# Patient Record
Sex: FEMALE | Employment: STUDENT | URBAN - METROPOLITAN AREA
[De-identification: names, ages, dates, MRNs, and addresses within clinical notes are randomized per-mention and may not be internally consistent; named-entity substitution may affect disease eponyms.]

---

## 2021-10-11 ENCOUNTER — TELEPHONE (OUTPATIENT)
Dept: OBGYN CLINIC | Facility: CLINIC | Age: 46
End: 2021-10-11

## 2021-11-18 ENCOUNTER — OFFICE VISIT (OUTPATIENT)
Dept: GASTROENTEROLOGY | Facility: CLINIC | Age: 46
End: 2021-11-18
Payer: COMMERCIAL

## 2021-11-18 VITALS
BODY MASS INDEX: 29.48 KG/M2 | DIASTOLIC BLOOD PRESSURE: 84 MMHG | WEIGHT: 146.2 LBS | HEART RATE: 61 BPM | SYSTOLIC BLOOD PRESSURE: 130 MMHG | HEIGHT: 59 IN

## 2021-11-18 DIAGNOSIS — K59.00 CONSTIPATION, UNSPECIFIED CONSTIPATION TYPE: ICD-10-CM

## 2021-11-18 DIAGNOSIS — R14.0 BLOATING: ICD-10-CM

## 2021-11-18 DIAGNOSIS — R10.84 GENERALIZED ABDOMINAL PAIN: Primary | ICD-10-CM

## 2021-11-18 PROCEDURE — 99203 OFFICE O/P NEW LOW 30 MIN: CPT | Performed by: NURSE PRACTITIONER

## 2021-11-18 RX ORDER — MULTIVITAMIN
1 TABLET ORAL DAILY
COMMUNITY

## 2022-03-07 ENCOUNTER — OFFICE VISIT (OUTPATIENT)
Dept: GASTROENTEROLOGY | Facility: CLINIC | Age: 47
End: 2022-03-07
Payer: COMMERCIAL

## 2022-03-07 VITALS
SYSTOLIC BLOOD PRESSURE: 142 MMHG | HEIGHT: 59 IN | WEIGHT: 144.6 LBS | DIASTOLIC BLOOD PRESSURE: 84 MMHG | BODY MASS INDEX: 29.15 KG/M2

## 2022-03-07 DIAGNOSIS — K58.2 IRRITABLE BOWEL SYNDROME WITH BOTH CONSTIPATION AND DIARRHEA: ICD-10-CM

## 2022-03-07 DIAGNOSIS — K21.9 GASTROESOPHAGEAL REFLUX DISEASE, UNSPECIFIED WHETHER ESOPHAGITIS PRESENT: Primary | ICD-10-CM

## 2022-03-07 DIAGNOSIS — Z12.11 SCREENING FOR COLON CANCER: Primary | ICD-10-CM

## 2022-03-07 DIAGNOSIS — K59.00 CONSTIPATION, UNSPECIFIED CONSTIPATION TYPE: ICD-10-CM

## 2022-03-07 DIAGNOSIS — R10.32 LEFT LOWER QUADRANT ABDOMINAL PAIN: ICD-10-CM

## 2022-03-07 PROCEDURE — 99213 OFFICE O/P EST LOW 20 MIN: CPT | Performed by: NURSE PRACTITIONER

## 2022-03-07 NOTE — PROGRESS NOTES
[address] [facility] - Outpatient Follow-up Note  Nacho Stewart 55 y o  female MRN: [id number]  Encounter: [id number]    ASSESSMENT AND PLAN:      1  Gastroesophageal reflux disease, unspecified whether esophagitis present  Patient states she has been experiencing increased acid reflux symptoms  She states she can feel at times acid reflux bubbling up in her esophagus  Patient states that her symptoms generally increased shortly after eating  Currently not taking any acid reflux medications  Discussed acid reflux diet, no med changes at this time until after EGD  Consider GERD, functional dyspepsia, gastric ulcer     - schedule EGD at [facility])  - frequent small meal  - no eating 2-3 hours before bedtime  - decrease caffeine  - acid reflux diet  - elevate head of bed  - food diary    2  Left lower quadrant abdominal pain  3  Constipation, unspecified constipation type  4  Irritable bowel syndrome with both constipation and diarrhea  Patient states after her last office visit in November 2021 she had been using the MiraLax and increase her fiber and was moving her bowels a little more frequently  With increased stress and personal issues she had not gotten enough of either at infrequently became constipated and then using the MiraLax had gotten diarrhea  She states after having bowel movements she would have increased lower left quadrant pain  Consider IBS-C, functional constipation    - schedule colonoscopy at [facility])  - Colace 100 mg p o  HS  - increase fluids  - food diary  - 20 g fiber daily  - MiraLax adjust for a bowel movement every day or every other day  Followup Appointment:  2 months, after EGD/Colonoscopy  ______________________________________________________________________    Chief Complaint   Patient presents with    Follow-up abdominal pain     HPI:  51-year-old female presents for follow-up abdominal pain, bloating, constipation    Patient states that since last office visit she had tried to increase her fiber and she was taking the MiraLax however stress and personal issues she got away from her routine and the constipation return  She states she can go from diarrhea to constipation sometimes I believe it is due to her medication changes  And she states once she empties out from a bowel movement she has left lower quadrant pain as well  She denies nausea or vomiting  States she currently is having acid reflux increased after eating  She states she feels the reflux bubbling up in her esophagus  Abdominal pain changes with constipation versus diarrhea  Can be daily or every other day  Her bowel movements are routinely constipation versus loose  She denies hematochezia or melena  Patient states she has never had a colonoscopy, no family history of colon cancer or colon polyps, and has never had an EGD either  Nonsmoker, occasional EtOH        Historical Information   Past Medical History:   Diagnosis Date    GERD (gastroesophageal reflux disease)      Past Surgical History:   Procedure Laterality Date    HYSTERECTOMY      MYOMECTOMY       Social History     Substance and Sexual Activity   Alcohol Use Yes    Comment: rarly     Social History     Substance and Sexual Activity   Drug Use Not on file     Social History     Tobacco Use   Smoking Status Never Smoker   Smokeless Tobacco Never Used     Family History   Problem Relation Age of Onset    Breast cancer Mother     Hypertension Mother     Thyroid disease Mother     Depression Father     Hypertension Father     Lupus Sister     No Known Problems Brother     Thyroid disease Sister     Colon cancer Neg Hx     Colon polyps Neg Hx          Current Outpatient Medications:     Multiple Vitamin (multivitamin) tablet  Allergies   Allergen Reactions    Bactrim [Sulfamethoxazole-Trimethoprim] Rash    Cleocin [Clindamycin] Rash     Reviewed medications and allergies and updated as indicated    PHYSICAL EXAM: Blood pressure 142/84, height 4' 11 02" (1 499 m), weight 65 6 kg (144 lb 9 6 oz)  Body mass index is 29 19 kg/m²  General Appearance: NAD, cooperative, alert  Eyes: Anicteric, PERRLA, EOMI  ENT:  Normocephalic, atraumatic, normal mucosa  Neck:  Supple, symmetrical, trachea midline  Resp:  Clear to auscultation bilaterally; no rales, rhonchi or wheezing; respirations unlabored   CV:  S1 S2, Regular rate and rhythm; no murmur, rub, or gallop  GI:  Soft, increased right lower quadrant and left lower quadrant tenderness on palpation, non-distended; normal bowel sounds; no masses, no organomegaly   Rectal: Deferred  Musculoskeletal: No cyanosis, clubbing or edema  Normal ROM  Skin:  No jaundice, rashes, or lesions   Heme/Lymph: No palpable cervical lymphadenopathy  Psych: Normal affect, good eye contact  Neuro: No gross deficits, AAOx3    Lab Results:   No results found for: WBC, HGB, HCT, MCV, PLT  No results found for: NA, K, CL, CO2, ANIONGAP, BUN, CREATININE, GLUCOSE, GLUF, CALCIUM, CORRECTEDCA, AST, ALT, ALKPHOS, PROT, BILITOT, EGFR  No results found for: IRON, TIBC, FERRITIN  No results found for: LIPASE    Radiology Results:   No results found

## 2022-03-07 NOTE — PATIENT INSTRUCTIONS
Increase fluids  Colace 100mg bedtime  Food diary  Fiber 20 mg / day  Miralax; adjust for a normal bowel every day or every other day

## 2022-03-07 NOTE — TELEPHONE ENCOUNTER
Scheduled date of colonoscopy (as of today): 4/22/22  Physician performing colonoscopy: Dr Elida Miller  Location of colonoscopy:[facility]  Bowel prep reviewed with patient: Clenpiq  Instructions reviewed with patient by:  Roderick Lopez CMA  Clearances: None

## 2022-03-08 RX ORDER — SODIUM PICOSULFATE, MAGNESIUM OXIDE, AND ANHYDROUS CITRIC ACID 10; 3.5; 12 MG/160ML; G/160ML; G/160ML
LIQUID ORAL
Qty: 320 ML | Refills: 0 | Status: SHIPPED | COMMUNITY
Start: 2022-03-08 | End: 2022-04-15 | Stop reason: HOSPADM

## 2022-04-15 ENCOUNTER — ANESTHESIA (OUTPATIENT)
Dept: GASTROENTEROLOGY | Facility: AMBULATORY SURGERY CENTER | Age: 47
End: 2022-04-15

## 2022-04-15 ENCOUNTER — HOSPITAL ENCOUNTER (OUTPATIENT)
Dept: GASTROENTEROLOGY | Facility: AMBULATORY SURGERY CENTER | Age: 47
Discharge: HOME | End: 2022-04-15
Attending: INTERNAL MEDICINE
Payer: COMMERCIAL

## 2022-04-15 ENCOUNTER — ANESTHESIA EVENT (OUTPATIENT)
Dept: GASTROENTEROLOGY | Facility: AMBULATORY SURGERY CENTER | Age: 47
End: 2022-04-15

## 2022-04-15 VITALS
SYSTOLIC BLOOD PRESSURE: 139 MMHG | OXYGEN SATURATION: 99 % | TEMPERATURE: 97.6 F | DIASTOLIC BLOOD PRESSURE: 70 MMHG | HEART RATE: 82 BPM | RESPIRATION RATE: 20 BRPM

## 2022-04-15 DIAGNOSIS — K59.00 CONSTIPATION, UNSPECIFIED CONSTIPATION TYPE: ICD-10-CM

## 2022-04-15 DIAGNOSIS — R10.32 LEFT LOWER QUADRANT ABDOMINAL PAIN: ICD-10-CM

## 2022-04-15 DIAGNOSIS — K58.2 IRRITABLE BOWEL SYNDROME WITH BOTH CONSTIPATION AND DIARRHEA: ICD-10-CM

## 2022-04-15 DIAGNOSIS — K21.9 GASTROESOPHAGEAL REFLUX DISEASE, UNSPECIFIED WHETHER ESOPHAGITIS PRESENT: ICD-10-CM

## 2022-04-15 PROCEDURE — 43239 EGD BIOPSY SINGLE/MULTIPLE: CPT | Performed by: INTERNAL MEDICINE

## 2022-04-15 PROCEDURE — 45378 DIAGNOSTIC COLONOSCOPY: CPT | Performed by: INTERNAL MEDICINE

## 2022-04-15 RX ORDER — LIDOCAINE HYDROCHLORIDE 10 MG/ML
INJECTION, SOLUTION EPIDURAL; INFILTRATION; INTRACAUDAL; PERINEURAL AS NEEDED
Status: DISCONTINUED | OUTPATIENT
Start: 2022-04-15 | End: 2022-04-15

## 2022-04-15 RX ORDER — PROPOFOL 10 MG/ML
INJECTION, EMULSION INTRAVENOUS AS NEEDED
Status: DISCONTINUED | OUTPATIENT
Start: 2022-04-15 | End: 2022-04-15

## 2022-04-15 RX ORDER — SODIUM CHLORIDE, SODIUM LACTATE, POTASSIUM CHLORIDE, CALCIUM CHLORIDE 600; 310; 30; 20 MG/100ML; MG/100ML; MG/100ML; MG/100ML
50 INJECTION, SOLUTION INTRAVENOUS CONTINUOUS
Status: DISCONTINUED | OUTPATIENT
Start: 2022-04-15 | End: 2022-04-19 | Stop reason: HOSPADM

## 2022-04-15 RX ADMIN — PROPOFOL 50 MG: 10 INJECTION, EMULSION INTRAVENOUS at 13:31

## 2022-04-15 RX ADMIN — PROPOFOL 100 MG: 10 INJECTION, EMULSION INTRAVENOUS at 13:10

## 2022-04-15 RX ADMIN — PROPOFOL 50 MG: 10 INJECTION, EMULSION INTRAVENOUS at 13:26

## 2022-04-15 RX ADMIN — LIDOCAINE HYDROCHLORIDE 50 MG: 10 INJECTION, SOLUTION EPIDURAL; INFILTRATION; INTRACAUDAL; PERINEURAL at 13:05

## 2022-04-15 RX ADMIN — PROPOFOL 50 MG: 10 INJECTION, EMULSION INTRAVENOUS at 13:18

## 2022-04-15 RX ADMIN — SODIUM CHLORIDE, SODIUM LACTATE, POTASSIUM CHLORIDE, CALCIUM CHLORIDE 50 ML/HR: 600; 310; 30; 20 INJECTION, SOLUTION INTRAVENOUS at 13:02

## 2022-04-15 NOTE — ANESTHESIA PREPROCEDURE EVALUATION
Procedure:  COLONOSCOPY  EGD    Relevant Problems   No relevant active problems        Physical Exam    Airway    Mallampati score: II  TM Distance: >3 FB  Neck ROM: full     Dental   No notable dental hx     Cardiovascular  Cardiovascular exam normal    Pulmonary  Pulmonary exam normal     Other Findings        Anesthesia Plan  ASA Score- 1     Anesthesia Type- IV sedation with anesthesia with ASA Monitors  Additional Monitors:   Airway Plan:           Plan Factors-    Chart reviewed  Patient summary reviewed  Patient is not a current smoker  Induction- intravenous  Postoperative Plan-     Informed Consent- Anesthetic plan and risks discussed with patient  I personally reviewed this patient with the CRNA  Discussed and agreed on the Anesthesia Plan with the CRNA  Sweta Jason

## 2022-04-15 NOTE — H&P
History and Physical -  [facility]  [facility] 55 y o  female MRN: [id number]    HPI: [facility] is a 55y o  year old female who presents for EGD and colonoscopy secondary to GERD and constipation and abdominal pain    REVIEW OF SYSTEMS: Per the HPI, and otherwise unremarkable      Historical Information   Past Medical History:   Diagnosis Date    GERD (gastroesophageal reflux disease)      Past Surgical History:   Procedure Laterality Date    ANKLE SURGERY Left     HYSTERECTOMY      MYOMECTOMY      SINUS SURGERY      TONSILLECTOMY      WRIST SURGERY Right      Social History   Social History     Substance and Sexual Activity   Alcohol Use Not Currently    Comment: rarly     Social History     Substance and Sexual Activity   Drug Use Never     Social History     Tobacco Use   Smoking Status Never Smoker   Smokeless Tobacco Never Used     Family History   Problem Relation Age of Onset    Breast cancer Mother     Hypertension Mother     Thyroid disease Mother     Depression Father     Hypertension Father     Lupus Sister     No Known Problems Brother     Thyroid disease Sister     Colon cancer Neg Hx     Colon polyps Neg Hx        Meds/Allergies       Current Outpatient Medications:     Multiple Vitamin (multivitamin) tablet    Sod Picosulfate-Mag Ox-Cit Acd (Clenpiq) 10-3 5-12 MG-GM -GM/160ML SOLN    Current Facility-Administered Medications:     lactated ringers infusion, 50 mL/hr, Intravenous, Continuous    Allergies   Allergen Reactions    Bactrim [Sulfamethoxazole-Trimethoprim] Rash    Cleocin [Clindamycin] Rash       Objective     /65   Pulse 80   Temp 97 6 °F (36 4 °C) (Temporal)   Resp 20   SpO2 99%     PHYSICAL EXAM    Gen: NAD AAOx3  Head: Normocephalic, Atraumatic  CV: S1S2 RRR no m/r/g  CHEST: Clear b/l no c/r/w  ABD: soft, +BS NT/ND  EXT: no edema    ASSESSMENT/PLAN:  This is a 55y o  year old female here for EGD and colonoscopy secondary to constipation, abdominal pain, and GERD, and she is stable and optimized for her procedure

## 2022-04-15 NOTE — ANESTHESIA POSTPROCEDURE EVALUATION
Post-Op Assessment Note    CV Status:  Stable  Pain Score: 0    Pain management: adequate     Hydration Status:  Stable   PONV Controlled:  None   Airway Patency:  Patent      Post Op Vitals Reviewed: Yes      Staff: CRNA         No complications documented      BP   126/72   Temp      Pulse 67   Resp 14   SpO2 100%

## 2022-04-15 NOTE — DISCHARGE INSTRUCTIONS
Upper Endoscopy and Colonoscopy   WHAT YOU NEED TO KNOW:   An upper endoscopy is also called an upper gastrointestinal (GI) endoscopy, or an esophagogastroduodenoscopy (EGD)  It is a procedure to examine the inside of your esophagus, stomach, and duodenum (first part of the small intestine) with a scope  You may feel bloated, gassy, or have some abdominal discomfort after your procedure  Your throat may be sore for 24 to 36 hours  You may burp or pass gas from air that is still inside your body  A colonoscopy is a procedure to examine the inside of your colon (intestine) with a scope  Polyps or tissue growths may have been removed during your colonoscopy  It is normal to feel bloated and to have some abdominal discomfort  You should be passing gas  If you have hemorrhoids or you had polyps removed, you may have a small amount of bleeding  DISCHARGE INSTRUCTIONS:   Seek care immediately if:   · You have sudden, severe abdominal pain  · You have problems swallowing  · You have a large amount of black, sticky bowel movements or blood in your bowel movements  · You have sudden trouble breathing  · You feel weak, lightheaded, or faint or your heart beats faster than normal for you  Contact your healthcare provider if:   · You have a fever and chills  · You have nausea or are vomiting  · Your abdomen is bloated or feels full and hard  · You have abdominal pain  · You have a large amount of black, sticky bowel movements or blood in your bowel movements  · You have not had a bowel movement for 3 days after your procedure  · You have rash or hives  · You have questions or concerns about your procedure  Activity:   ·       Do not lift, strain, or run for 24 hours after your procedure  ·       Rest after your procedure  You have been given medicine to relax you  Do not drive or make important decisions until the day after your procedure   Return to your normal activity as directed  ·       Relieve gas and discomfort from bloating by lying on your right side with a heating pad on your abdomen  You may need to take short walks to help the gas move out  Eat small meals until bloating is relieved  Follow up with your healthcare provider as directed: Write down your questions so you remember to ask them during your visits  If you take a blood thinner, please review the specific instructions from your endoscopist about when you should resume it  These can be found in the Recommendation and Your Medication list sections of this After Visit Summary  Hemorrhoids   WHAT YOU NEED TO KNOW:   What are hemorrhoids? Hemorrhoids are swollen blood vessels inside your rectum (internal hemorrhoids) or on your anus (external hemorrhoids)  Sometimes a hemorrhoid may prolapse  This means it extends out of your anus  What increases my risk for hemorrhoids? · Pregnancy or obesity    · Straining or sitting for a long time during bowel movements    · Liver disease    · Weak muscles around the anus caused by older age, rectal surgery, or anal intercourse    · A lack of physical activity    · Chronic diarrhea or constipation    · A low-fiber diet    What are the signs and symptoms of hemorrhoids? · Pain or itching around your anus or inside your rectum    · Swelling or bumps around your anus    · Bright red blood in your bowel movement, on the toilet paper, or in the toilet bowl    · Tissue bulging out of your anus (prolapsed hemorrhoids)    · Incontinence (poor control over urine or bowel movements)    How are hemorrhoids diagnosed? Your healthcare provider will ask about your symptoms, the foods you eat, and your bowel movements  He or she will examine your anus for external hemorrhoids  You may need the following:  · A digital rectal exam  is a test to check for hemorrhoids   Your healthcare provider will put a gloved finger inside your anus to feel for the hemorrhoids  · An anoscopy  is a test that uses a scope (small tube with a light and camera on the end) to look at your hemorrhoids  How are hemorrhoids treated? Treatment will depend on your symptoms  You may need any of the following:  · Medicines  can help decrease pain and swelling, and soften your bowel movement  The medicine may be a pill, pad, cream, or ointment  · Procedures  may be used to shrink or remove your hemorrhoid  Examples include rubber-band ligation, sclerotherapy, and photocoagulation  These procedures may be done in your healthcare provider's office  Ask your healthcare provider for more information about these procedures  · Surgery  may be needed to shrink or remove your hemorrhoids  How can I manage my symptoms? · Apply ice on your anus for 15 to 20 minutes every hour or as directed  Use an ice pack, or put crushed ice in a plastic bag  Cover it with a towel before you apply it to your anus  Ice helps prevent tissue damage and decreases swelling and pain  · Take a sitz bath  Fill a bathtub with 4 to 6 inches of warm water  You may also use a sitz bath pan that fits inside a toilet bowl  Sit in the sitz bath for 15 minutes  Do this 3 times a day, and after each bowel movement  The warm water can help decrease pain and swelling  · Keep your anal area clean  Gently wash the area with warm water daily  Soap may irritate the area  After a bowel movement, wipe with moist towelettes or wet toilet paper  Dry toilet paper can irritate the area  How can I help prevent hemorrhoids? · Do not strain to have a bowel movement  Do not sit on the toilet too long  These actions can increase pressure on the tissues in your rectum and anus  · Drink plenty of liquids  Liquids can help prevent constipation  Ask how much liquid to drink each day and which liquids are best for you  · Eat a variety of high-fiber foods    Examples include fruits, vegetables, and whole grains  Ask your healthcare provider how much fiber you need each day  You may need to take a fiber supplement  · Exercise as directed  Exercise, such as walking, may make it easier to have a bowel movement  Ask your healthcare provider to help you create an exercise plan  · Do not have anal sex  Anal sex can weaken the skin around your rectum and anus  · Avoid heavy lifting  This can cause straining and increase your risk for another hemorrhoid  When should I seek immediate care? · You have severe pain in your rectum or around your anus  · You have severe pain in your abdomen and you are vomiting  · You have bleeding from your anus that soaks through your underwear  When should I contact my healthcare provider? · You have frequent and painful bowel movements  · Your hemorrhoid looks or feels more swollen than usual      · You do not have a bowel movement for 2 days or more  · You see or feel tissue coming through your anus  · You have questions or concerns about your condition or care  CARE AGREEMENT:   You have the right to help plan your care  Learn about your health condition and how it may be treated  Discuss treatment options with your healthcare providers to decide what care you want to receive  You always have the right to refuse treatment  The above information is an  only  It is not intended as medical advice for individual conditions or treatments  Talk to your doctor, nurse or pharmacist before following any medical regimen to see if it is safe and effective for you  © Copyright [employer] 2022 Information is for End User's use only and may not be sold, redistributed or otherwise used for commercial purposes  All illustrations and images included in CareNotes® are the copyrighted property of A D A M , Inc  or Rashmi Perry  Hemorrhoids   WHAT YOU NEED TO KNOW:   What are hemorrhoids?   Hemorrhoids are swollen blood vessels inside your rectum (internal hemorrhoids) or on your anus (external hemorrhoids)  Sometimes a hemorrhoid may prolapse  This means it extends out of your anus  What increases my risk for hemorrhoids? · Pregnancy or obesity    · Straining or sitting for a long time during bowel movements    · Liver disease    · Weak muscles around the anus caused by older age, rectal surgery, or anal intercourse    · A lack of physical activity    · Chronic diarrhea or constipation    · A low-fiber diet    What are the signs and symptoms of hemorrhoids? · Pain or itching around your anus or inside your rectum    · Swelling or bumps around your anus    · Bright red blood in your bowel movement, on the toilet paper, or in the toilet bowl    · Tissue bulging out of your anus (prolapsed hemorrhoids)    · Incontinence (poor control over urine or bowel movements)    How are hemorrhoids diagnosed? Your healthcare provider will ask about your symptoms, the foods you eat, and your bowel movements  He or she will examine your anus for external hemorrhoids  You may need the following:  · A digital rectal exam  is a test to check for hemorrhoids  Your healthcare provider will put a gloved finger inside your anus to feel for the hemorrhoids  · An anoscopy  is a test that uses a scope (small tube with a light and camera on the end) to look at your hemorrhoids  How are hemorrhoids treated? Treatment will depend on your symptoms  You may need any of the following:  · Medicines  can help decrease pain and swelling, and soften your bowel movement  The medicine may be a pill, pad, cream, or ointment  · Procedures  may be used to shrink or remove your hemorrhoid  Examples include rubber-band ligation, sclerotherapy, and photocoagulation  These procedures may be done in your healthcare provider's office  Ask your healthcare provider for more information about these procedures       · Surgery  may be needed to shrink or remove your hemorrhoids  How can I manage my symptoms? · Apply ice on your anus for 15 to 20 minutes every hour or as directed  Use an ice pack, or put crushed ice in a plastic bag  Cover it with a towel before you apply it to your anus  Ice helps prevent tissue damage and decreases swelling and pain  · Take a sitz bath  Fill a bathtub with 4 to 6 inches of warm water  You may also use a sitz bath pan that fits inside a toilet bowl  Sit in the sitz bath for 15 minutes  Do this 3 times a day, and after each bowel movement  The warm water can help decrease pain and swelling  · Keep your anal area clean  Gently wash the area with warm water daily  Soap may irritate the area  After a bowel movement, wipe with moist towelettes or wet toilet paper  Dry toilet paper can irritate the area  How can I help prevent hemorrhoids? · Do not strain to have a bowel movement  Do not sit on the toilet too long  These actions can increase pressure on the tissues in your rectum and anus  · Drink plenty of liquids  Liquids can help prevent constipation  Ask how much liquid to drink each day and which liquids are best for you  · Eat a variety of high-fiber foods  Examples include fruits, vegetables, and whole grains  Ask your healthcare provider how much fiber you need each day  You may need to take a fiber supplement  · Exercise as directed  Exercise, such as walking, may make it easier to have a bowel movement  Ask your healthcare provider to help you create an exercise plan  · Do not have anal sex  Anal sex can weaken the skin around your rectum and anus  · Avoid heavy lifting  This can cause straining and increase your risk for another hemorrhoid  When should I seek immediate care? · You have severe pain in your rectum or around your anus  · You have severe pain in your abdomen and you are vomiting  · You have bleeding from your anus that soaks through your underwear      When should I contact my healthcare provider? · You have frequent and painful bowel movements  · Your hemorrhoid looks or feels more swollen than usual      · You do not have a bowel movement for 2 days or more  · You see or feel tissue coming through your anus  · You have questions or concerns about your condition or care  CARE AGREEMENT:   You have the right to help plan your care  Learn about your health condition and how it may be treated  Discuss treatment options with your healthcare providers to decide what care you want to receive  You always have the right to refuse treatment  The above information is an  only  It is not intended as medical advice for individual conditions or treatments  Talk to your doctor, nurse or pharmacist before following any medical regimen to see if it is safe and effective for you  © Copyright [employer] 2022 Information is for End User's use only and may not be sold, redistributed or otherwise used for commercial purposes  All illustrations and images included in CareNotes® are the copyrighted property of A D A M , Inc  or [facility] Augustine Donaldson       Gastritis   WHAT YOU NEED TO KNOW:   What is gastritis? Gastritis is inflammation or irritation of the lining of your stomach  What increases my risk for gastritis? · Infection with bacteria, a virus, or a parasite    · NSAIDs, aspirin, or steroid medicine    · Use of tobacco products or alcohol    · Trauma such as an injury to your stomach or intestine    · Autoimmune disorders such as diabetes, thyroid disease, or Crohn disease    · Stress    · Age older than 60 years    · Illegal drugs, such as cocaine    What are the signs and symptoms of gastritis?    · Stomach pain, burning, or tenderness when you press on it    · Stomach fullness or tightness    · Nausea or vomiting    · Loss of appetite, or feeling full quickly when you eat    · Bad breath    · Fatigue or feeling more tired than usual    · Heartburn    How is gastritis diagnosed? Your healthcare provider will ask about your signs and symptoms and examine you  You may need any of the following:  · Blood tests  may be used to show an infection, dehydration, or anemia (low red blood cell levels)  · A bowel movement sample  may be tested for blood or the germ that may be causing your gastritis  · A breath test  may show if H pylori is causing your gastritis  You will be given a liquid to drink  Then you will breathe into a bag  Your healthcare provider will measure the amount of carbon dioxide in your breath  Extra amounts of carbon dioxide may mean you have an H pylori infection  · An endoscopy  may be used to look for irritation or bleeding in your stomach  Your healthcare provider will use an endoscope (tube with a light and camera on the end) during the procedure  He or she may take a sample from your stomach to be tested  How is gastritis treated? Your symptoms may go away without treatment  Treatment will depend on what is causing your gastritis  Your healthcare provider may recommend changes to the medicines you take  Medicines may be given to help treat a bacterial infection or decrease stomach acid  How can I manage or prevent gastritis? · Do not smoke  Nicotine and other chemicals in cigarettes and cigars can make your symptoms worse and cause lung damage  Ask your healthcare provider for information if you currently smoke and need help to quit  E-cigarettes or smokeless tobacco still contain nicotine  Talk to your healthcare provider before you use these products  · Do not drink alcohol  Alcohol can prevent healing and make your gastritis worse  Talk to your healthcare provider if you need help to stop drinking  · Do not take NSAIDs or aspirin unless directed  These and similar medicines can cause irritation of your stomach lining   If your healthcare provider says it is okay to take NSAIDs, take them with food      · Do not eat foods that cause irritation  Foods such as oranges and salsa can cause burning or pain  Eat a variety of healthy foods  Examples include fruits (not citrus), vegetables, low-fat dairy products, beans, whole-grain breads, and lean meats and fish  Try to eat small meals, and drink water with your meals  Do not eat for at least 3 hours before you go to bed  · Find ways to relax and decrease stress  Stress can increase stomach acid and make gastritis worse  Activities such as yoga, meditation, or listening to music can help you relax  Spend time with friends, or do things you enjoy  Call 911 for any of the following:   · You develop chest pain or shortness of breath  When should I seek immediate care? · You vomit blood  · You have black or bloody bowel movements  · You have severe stomach or back pain  When should I contact my healthcare provider? · You have a fever  · You have new or worsening symptoms, even after treatment  · You have questions or concerns about your condition or care  CARE AGREEMENT:   You have the right to help plan your care  Learn about your health condition and how it may be treated  Discuss treatment options with your healthcare providers to decide what care you want to receive  You always have the right to refuse treatment  The above information is an  only  It is not intended as medical advice for individual conditions or treatments  Talk to your doctor, nurse or pharmacist before following any medical regimen to see if it is safe and effective for you  © Copyright [employer] 2022 Information is for End User's use only and may not be sold, redistributed or otherwise used for commercial purposes   All illustrations and images included in CareNotes® are the copyrighted property of A D A M , Inc  or [address] [place], [place] [address]

## 2022-04-22 ENCOUNTER — TELEPHONE (OUTPATIENT)
Dept: GASTROENTEROLOGY | Facility: CLINIC | Age: 47
End: 2022-04-22

## 2022-04-22 DIAGNOSIS — A04.8 H. PYLORI INFECTION: Primary | ICD-10-CM

## 2022-04-22 RX ORDER — PANTOPRAZOLE SODIUM 40 MG/1
40 TABLET, DELAYED RELEASE ORAL 2 TIMES DAILY
Qty: 28 TABLET | Refills: 0 | Status: SHIPPED | OUTPATIENT
Start: 2022-04-22 | End: 2022-05-31

## 2022-04-22 RX ORDER — METRONIDAZOLE 250 MG/1
250 TABLET ORAL
Qty: 56 TABLET | Refills: 0 | Status: SHIPPED | OUTPATIENT
Start: 2022-04-22 | End: 2022-05-06

## 2022-04-22 RX ORDER — BISMUTH SUBSALICYLATE 262 MG/1
262 TABLET, CHEWABLE ORAL
Qty: 56 TABLET | Refills: 0 | Status: SHIPPED | OUTPATIENT
Start: 2022-04-22 | End: 2022-05-06

## 2022-04-22 RX ORDER — TETRACYCLINE HYDROCHLORIDE 500 MG/1
500 CAPSULE ORAL
Qty: 56 CAPSULE | Refills: 0 | Status: SHIPPED | OUTPATIENT
Start: 2022-04-22 | End: 2022-05-06

## 2022-04-22 NOTE — TELEPHONE ENCOUNTER
AMEYA lehman from [facility] stating Pylera capsules for 14 days were $375; Insurance requires e-script; three separate meds might be cheaper  If ques  # [id number]

## 2022-05-09 ENCOUNTER — TELEPHONE (OUTPATIENT)
Dept: GASTROENTEROLOGY | Facility: CLINIC | Age: 47
End: 2022-05-09

## 2022-05-09 NOTE — TELEPHONE ENCOUNTER
Pt just completed flagyl and tetracycline for H Pylori treatment  Spoke with patient  Having chest pain at times  The chest pain started earlier this weekend  Not c/o difficulty breathing but states she does not feel like herself  Entire body feels achy  Advised ER

## 2022-05-09 NOTE — TELEPHONE ENCOUNTER
(language barrier)  Pt states she finished 2 Abx last night; since Fri has pain over her entire body and chills; chest pressure/ribs feel "tight" w/ pain in upper chest; tongue is numb j # [phone]

## 2022-05-31 ENCOUNTER — TELEPHONE (OUTPATIENT)
Dept: GASTROENTEROLOGY | Facility: CLINIC | Age: 47
End: 2022-05-31

## 2022-05-31 DIAGNOSIS — K21.9 GASTROESOPHAGEAL REFLUX DISEASE WITHOUT ESOPHAGITIS: Primary | ICD-10-CM

## 2022-05-31 DIAGNOSIS — K21.9 GASTROESOPHAGEAL REFLUX DISEASE WITHOUT ESOPHAGITIS: ICD-10-CM

## 2022-05-31 RX ORDER — PANTOPRAZOLE SODIUM 40 MG/1
40 TABLET, DELAYED RELEASE ORAL 2 TIMES DAILY
Qty: 60 TABLET | Refills: 2 | Status: SHIPPED | OUTPATIENT
Start: 2022-05-31 | End: 2022-05-31 | Stop reason: ALTCHOICE

## 2022-05-31 RX ORDER — PANTOPRAZOLE SODIUM 40 MG/1
40 TABLET, DELAYED RELEASE ORAL DAILY
Qty: 60 TABLET | Refills: 2 | Status: SHIPPED | OUTPATIENT
Start: 2022-05-31 | End: 2022-05-31

## 2022-05-31 RX ORDER — PANTOPRAZOLE SODIUM 40 MG/1
40 TABLET, DELAYED RELEASE ORAL DAILY
Qty: 30 TABLET | Refills: 2 | Status: SHIPPED | OUTPATIENT
Start: 2022-05-31 | End: 2022-07-01 | Stop reason: ALTCHOICE

## 2022-05-31 NOTE — TELEPHONE ENCOUNTER
Called Pt; advise NP sent script for Protonix to pharmacy/she should take two daily until she meets w/ NP Herm 7/01

## 2022-05-31 NOTE — TELEPHONE ENCOUNTER
Pt states OTC not working for reflux; has burning in her throat as soon as she eats w/ acid coming up/asks for Rx to [facility]  CB# [phone]

## 2022-05-31 NOTE — TELEPHONE ENCOUNTER
BID dosing pantoprazole needs PA  I confirmed with patient that she only had pantoprazole 40 mg during treatment for H pylori  More recently she has been trying an [address] product  Can we try her on a once daily PPI?

## 2022-06-01 PROBLEM — R92.2 DENSE BREAST TISSUE ON MAMMOGRAM: Status: ACTIVE | Noted: 2022-06-01

## 2022-06-01 PROBLEM — R92.30 DENSE BREAST TISSUE ON MAMMOGRAM: Status: ACTIVE | Noted: 2022-06-01

## 2022-06-01 PROBLEM — Z98.890 HISTORY OF BENIGN BREAST BIOPSY: Status: ACTIVE | Noted: 2020-03-23

## 2022-06-01 PROBLEM — Z91.89 AT HIGH RISK FOR BREAST CANCER: Status: ACTIVE | Noted: 2022-06-01

## 2022-06-13 NOTE — TELEPHONE ENCOUNTER
Pt states med she has taken for 2 wks works a little-takes approx 4 PM; works for [employer] but breakfast & lunch has [employer]  CB# [phone]

## 2022-06-13 NOTE — TELEPHONE ENCOUNTER
Spoke to the patient in regards to her message with taking pantoprazole in the evening and continuing to have GERD symptoms in the morning  Instructed patient she needs to be taking the pantoprazole 30-60 minutes prior to breakfast   If she continues to have acid reflux symptoms into the evening, encouraged her to call back to the office and Pepcid 40 mg HS can be prescribed

## 2022-07-01 ENCOUNTER — OFFICE VISIT (OUTPATIENT)
Dept: GASTROENTEROLOGY | Facility: CLINIC | Age: 47
End: 2022-07-01
Payer: COMMERCIAL

## 2022-07-01 VITALS
SYSTOLIC BLOOD PRESSURE: 116 MMHG | HEART RATE: 60 BPM | DIASTOLIC BLOOD PRESSURE: 68 MMHG | BODY MASS INDEX: 30.04 KG/M2 | WEIGHT: 149 LBS | HEIGHT: 59 IN

## 2022-07-01 DIAGNOSIS — K29.70 HELICOBACTER PYLORI GASTRITIS: ICD-10-CM

## 2022-07-01 DIAGNOSIS — K58.2 IRRITABLE BOWEL SYNDROME WITH BOTH CONSTIPATION AND DIARRHEA: ICD-10-CM

## 2022-07-01 DIAGNOSIS — B96.81 HELICOBACTER PYLORI GASTRITIS: ICD-10-CM

## 2022-07-01 DIAGNOSIS — K59.00 CONSTIPATION, UNSPECIFIED CONSTIPATION TYPE: ICD-10-CM

## 2022-07-01 DIAGNOSIS — R10.84 GENERALIZED ABDOMINAL PAIN: Primary | ICD-10-CM

## 2022-07-01 DIAGNOSIS — Z12.11 SCREENING FOR COLON CANCER: ICD-10-CM

## 2022-07-01 DIAGNOSIS — K21.9 GASTROESOPHAGEAL REFLUX DISEASE WITHOUT ESOPHAGITIS: ICD-10-CM

## 2022-07-01 PROCEDURE — 99213 OFFICE O/P EST LOW 20 MIN: CPT | Performed by: NURSE PRACTITIONER

## 2022-07-01 RX ORDER — PANTOPRAZOLE SODIUM 40 MG/1
40 TABLET, DELAYED RELEASE ORAL
Qty: 60 TABLET | Refills: 6 | Status: SHIPPED | OUTPATIENT
Start: 2022-07-01 | End: 2022-09-29 | Stop reason: ALTCHOICE

## 2022-07-01 RX ORDER — FAMOTIDINE 40 MG/1
40 TABLET, FILM COATED ORAL
Qty: 30 TABLET | Refills: 6 | Status: SHIPPED | OUTPATIENT
Start: 2022-07-01 | End: 2022-09-29 | Stop reason: ALTCHOICE

## 2022-07-01 NOTE — PROGRESS NOTES
[id number] - Outpatient Follow-up Note  Fredy Sales 55 y o  female MRN: [id number]  Encounter: [id number]    ASSESSMENT AND PLAN:      1  Generalized abdominal pain  2  Constipation, unspecified constipation type  3  Irritable bowel syndrome with both constipation and diarrhea  Patient states that since her last office visit and EGD/colonoscopy her abdominal discomfort has been resolved  She also states that she is currently making better dietary choices which may be contributing as well  She also states she is utilizing her fiber a little more which is helping with her constipation  She states she is now having daily normal bowel movements  4  Gastroesophageal reflux disease without esophagitis  Patient states that since her EGD and being placed on pantoprazole for mild antral gastritis as well as treatment for H pylori, she continues to have breakthrough acid reflux symptoms especially after eating meals and also increased in the evening and nighttime  Discussed with patient increasing PPI to twice daily prior to meals and also adding famotidine HS  Patient states she does not use any pillows and lies flat on her bed  Discussed raising the head of the bed and also not eating 3 hours prior to bedtime  Discussed with patient that while waiting to submit specimen for H pylori eradication she can use Pepcid for breakthrough acid reflux symptoms  However she cannot take the PPI  - pantoprazole (PROTONIX) 40 mg tablet; Take 1 tablet (40 mg total) by mouth 2 (two) times a day before meals  Dispense: 60 tablet; Refill: 6  - famotidine (PEPCID) 40 MG tablet; Take 1 tablet (40 mg total) by mouth daily at bedtime  Dispense: 30 tablet; Refill: 6    5  H pylori   Patient did test positive for H pylori with recent EGD  She was treated with quadruple therapy  Discussed with her doing the eradication testing with stool specimen    Patient did complete antibiotic therapy approximately mid May and can submit stool sample for H pylori eradication any time after holding PPI for 14 days  - stool study for H pylori eradication    6  Screening for colon cancer  Colonoscopy 04/15/2022; 10 year recall    Followup Appointment:  4 months  ______________________________________________________________________    Chief Complaint   Patient presents with    Follow-up     Still having a lot of reflux issues     HPI:  51-year-old female presents for follow-up office visit and colonoscopy/EGD  Patient states since off last office visit that her abdominal discomfort has resolved and states that her bowel movements are more consistent with increasing her fiber  Patient does state however that she does have increased acid reflux symptoms after she eats and also increased at bedtime  Patient was placed on pantoprazole 40 mg daily with results of EGD  EGD revealed mild antral gastritis, normal esophagus and duodenum  However biopsy did reveal positive H pylori  Patient was treated with quadruple therapy  Patient denies nausea, vomiting or abdominal pain  States she is having daily normal bowel movements  Denies hematochezia or melena  Colonoscopy revealed small internal hemorrhoids, otherwise normal, 10 year recall  Nonsmoker, rare ETOH use      Historical Information   Past Medical History:   Diagnosis Date    At high risk for breast cancer 2019    Preet Shearer lifetime risk 28%    Dense breast tissue on mammogram     GERD (gastroesophageal reflux disease)     Hematuria     Urology evaluation    History of benign breast biopsy 03/23/2020    fibroadenoma    Hx of screening mammography 03/2021    Migraines      Past Surgical History:   Procedure Laterality Date    ANKLE SURGERY Left     BREAST BIOPSY Right 03/23/2020    fibroadenoma    CYST REMOVAL Right     axilla about 20 yrs ago    HYSTERECTOMY  2009    uterus and cervix    LAPAROSCOPIC ENDOMETRIOSIS FULGURATION  2001    MAMMO (HISTORICAL)  03/19/2021    Extremely dense    MYOMECTOMY  03/2020    right bx benign fibroadenoma    SINUS SURGERY      TONSILLECTOMY      WRIST SURGERY Right      Social History     Substance and Sexual Activity   Alcohol Use Not Currently    Comment: rarly     Social History     Substance and Sexual Activity   Drug Use Never     Social History     Tobacco Use   Smoking Status Never Smoker   Smokeless Tobacco Never Used     Family History   Problem Relation Age of Onset    Breast cancer Mother 54    Hypertension Mother     Thyroid disease Mother     Depression Father     Hypertension Father     Lupus Sister     Thyroid disease Sister     No Known Problems Brother     Colon cancer Neg Hx     Colon polyps Neg Hx          Current Outpatient Medications:     famotidine (PEPCID) 40 MG tablet    Multiple Vitamin (multivitamin) tablet    pantoprazole (PROTONIX) 40 mg tablet  Allergies   Allergen Reactions    Bactrim [Sulfamethoxazole-Trimethoprim] Rash    Cleocin [Clindamycin] Rash     Reviewed medications and allergies and updated as indicated    PHYSICAL EXAM:    Blood pressure 116/68, pulse 60, height 4' 11 02" (1 499 m), weight 67 6 kg (149 lb)  Body mass index is 30 07 kg/m²  Normal exam    General Appearance: NAD, cooperative, alert  Eyes: Anicteric, PERRLA, EOMI  ENT:  Normocephalic, atraumatic, normal mucosa  Neck:  Supple, symmetrical, trachea midline  Resp:  Clear to auscultation bilaterally; no rales, rhonchi or wheezing; respirations unlabored   CV:  S1 S2, Regular rate and rhythm; no murmur, rub, or gallop  GI:  Soft, non-tender, non-distended; normal bowel sounds; no masses, no organomegaly   Rectal: Deferred  Musculoskeletal: No cyanosis, clubbing or edema  Normal ROM    Skin:  No jaundice, rashes, or lesions   Heme/Lymph: No palpable cervical lymphadenopathy  Psych: Normal affect, good eye contact  Neuro: No gross deficits, AAOx3    Lab Results:   No results found for: WBC, HGB, HCT, MCV, PLT  No results found for: NA, K, CL, CO2, ANIONGAP, BUN, CREATININE, GLUCOSE, GLUF, CALCIUM, CORRECTEDCA, AST, ALT, ALKPHOS, PROT, BILITOT, EGFR  No results found for: IRON, TIBC, FERRITIN  No results found for: LIPASE    Radiology Results:   No results found

## 2022-08-28 LAB — H PYLORI AG STL QL IA: NEGATIVE

## 2022-09-28 NOTE — PROGRESS NOTES
Assessment/Plan:    Encounter for gynecological examination (general) (routine) without abnormal findings  Strugglng with 11 yo daughter's mental health and personal anxiety/stress  Seeing counselor, declines meds  Ex- to be incarcerated for 5 years; no h/o DV  No breast or gyn complaints  Normal breast and pelvic exams s/p hysterectomy  Mammo order given, last 3/2022 BIRADS 2C         Diagnoses and all orders for this visit:    Encounter for gynecological examination (general) (routine) without abnormal findings    Encounter for screening mammogram for malignant neoplasm of breast  -     Mammo screening bilateral w 3d & cad; Future          Subjective:      Patient ID: Colleen Swenson is a 52 y o  female  HPI Here for well check    The following portions of the patient's history were reviewed and updated as appropriate:   She  has a past medical history of At high risk for breast cancer (2019), Dense breast tissue on mammogram, GERD (gastroesophageal reflux disease), Hematuria, History of benign breast biopsy (03/23/2020), screening mammography (03/2021), and Migraines  She   Patient Active Problem List    Diagnosis Date Noted    Encounter for gynecological examination (general) (routine) without abnormal findings 09/29/2022    Dense breast tissue on mammogram - heterogeneous 2022 06/01/2022    At high risk for breast cancer 06/01/2022    History of benign breast biopsy 03/23/2020     She  has a past surgical history that includes Hysterectomy (2009); Myomectomy (03/2020); Tonsillectomy; Ankle surgery (Left); Sinus surgery; Wrist surgery (Right); Cyst Removal (Right); Laparoscopic endometriosis fulguration (2001); Breast biopsy (Right, 03/23/2020); and Mammo (historical) (03/31/2022)  Her family history includes Breast cancer (age of onset: 54) in her mother; Depression in her father; Hypertension in her father and mother; Lupus in her sister; No Known Problems in her brother;  Thyroid disease in her mother and sister  She  reports that she has never smoked  She has never used smokeless tobacco  She reports previous alcohol use  She reports that she does not use drugs  Current Outpatient Medications   Medication Sig Dispense Refill    Multiple Vitamin (multivitamin) tablet Take 1 tablet by mouth daily       No current facility-administered medications for this visit  She is allergic to bactrim [sulfamethoxazole-trimethoprim] and cleocin [clindamycin]       Review of Systems  No breast, bladder, bowel changes    +Stressors  +Anxiety      Objective:      /80 (BP Location: Left arm, Patient Position: Sitting, Cuff Size: Standard)   Ht 4' 11" (1 499 m)   Wt 69 9 kg (154 lb)   BMI 31 10 kg/m²          Physical Exam    General appearance: no distress, pleasant  Tearful when talking about daughter  Neck: thyroid without nodules or thyromegaly, no palpable adenopathy  Lymph nodes: no palpable adenopathy  Breasts: no masses, nodes or skin changes  Abdomen: soft, non tender, no palpable masses  Pelvic exam: normal external genitalia, urethral meatus normal, vaginal cuff intact, no adnexal masses, non tender  Rectal exam: no masses, non tender, RV confirms above

## 2022-09-29 ENCOUNTER — ANNUAL EXAM (OUTPATIENT)
Dept: OBGYN CLINIC | Facility: CLINIC | Age: 47
End: 2022-09-29

## 2022-09-29 VITALS
SYSTOLIC BLOOD PRESSURE: 120 MMHG | DIASTOLIC BLOOD PRESSURE: 80 MMHG | HEIGHT: 59 IN | BODY MASS INDEX: 31.04 KG/M2 | WEIGHT: 154 LBS

## 2022-09-29 DIAGNOSIS — Z12.31 ENCOUNTER FOR SCREENING MAMMOGRAM FOR MALIGNANT NEOPLASM OF BREAST: ICD-10-CM

## 2022-09-29 DIAGNOSIS — Z01.419 ENCOUNTER FOR GYNECOLOGICAL EXAMINATION (GENERAL) (ROUTINE) WITHOUT ABNORMAL FINDINGS: Primary | ICD-10-CM

## 2022-09-29 PROBLEM — R92.2 DENSE BREAST TISSUE ON MAMMOGRAM: Status: RESOLVED | Noted: 2022-06-01 | Resolved: 2022-09-29

## 2022-09-29 PROBLEM — R92.30 DENSE BREAST TISSUE ON MAMMOGRAM: Status: RESOLVED | Noted: 2022-06-01 | Resolved: 2022-09-29

## 2022-09-29 NOTE — LETTER
September 29, 2022     Stacey Mcnamara, [address]  [facility] [id number]    Patient: Brian English   YOB: 1975   Date of Visit: 9/29/2022       Dear Dr Aj Gonzalez: Thank you for referring Brian English to me for evaluation  Below are my notes for this consultation  If you have questions, please do not hesitate to call me  I look forward to following your patient along with you  Sincerely,        Karri Barros MD        CC: No Recipients  Karri Barros MD  9/29/2022  3:32 PM  Sign when Signing Visit  Assessment/Plan:    Encounter for gynecological examination (general) (routine) without abnormal findings  Strugglng with 11 yo daughter's mental health and personal anxiety/stress  Seeing counselor, declines meds  Ex- to be incarcerated for 5 years; no h/o DV  No breast or gyn complaints  Normal breast and pelvic exams s/p hysterectomy  Mammo order given, last 3/2022 BIRADS 2C         Diagnoses and all orders for this visit:    Encounter for gynecological examination (general) (routine) without abnormal findings    Encounter for screening mammogram for malignant neoplasm of breast  -     Mammo screening bilateral w 3d & cad; Future          Subjective:      Patient ID: Brian English is a 52 y o  female  HPI Here for well check    The following portions of the patient's history were reviewed and updated as appropriate:   She  has a past medical history of At high risk for breast cancer (2019), Dense breast tissue on mammogram, GERD (gastroesophageal reflux disease), Hematuria, History of benign breast biopsy (03/23/2020), screening mammography (03/2021), and Migraines    She   Patient Active Problem List    Diagnosis Date Noted    Encounter for gynecological examination (general) (routine) without abnormal findings 09/29/2022    Dense breast tissue on mammogram - heterogeneous 2022 06/01/2022    At high risk for breast cancer 06/01/2022    History of benign breast biopsy 03/23/2020     She  has a past surgical history that includes Hysterectomy (2009); Myomectomy (03/2020); Tonsillectomy; Ankle surgery (Left); Sinus surgery; Wrist surgery (Right); Cyst Removal (Right); Laparoscopic endometriosis fulguration (2001); Breast biopsy (Right, 03/23/2020); and Mammo (historical) (03/31/2022)  Her family history includes Breast cancer (age of onset: 54) in her mother; Depression in her father; Hypertension in her father and mother; Lupus in her sister; No Known Problems in her brother; Thyroid disease in her mother and sister  She  reports that she has never smoked  She has never used smokeless tobacco  She reports previous alcohol use  She reports that she does not use drugs  Current Outpatient Medications   Medication Sig Dispense Refill    Multiple Vitamin (multivitamin) tablet Take 1 tablet by mouth daily       No current facility-administered medications for this visit  She is allergic to bactrim [sulfamethoxazole-trimethoprim] and cleocin [clindamycin]       Review of Systems  No breast, bladder, bowel changes    +Stressors  +Anxiety      Objective:      /80 (BP Location: Left arm, Patient Position: Sitting, Cuff Size: Standard)   Ht 4' 11" (1 499 m)   Wt 69 9 kg (154 lb)   BMI 31 10 kg/m²          Physical Exam    General appearance: no distress, pleasant  Tearful when talking about daughter  Neck: thyroid without nodules or thyromegaly, no palpable adenopathy  Lymph nodes: no palpable adenopathy  Breasts: no masses, nodes or skin changes  Abdomen: soft, non tender, no palpable masses  Pelvic exam: normal external genitalia, urethral meatus normal, vaginal cuff intact, no adnexal masses, non tender  Rectal exam: no masses, non tender, RV confirms above

## 2022-09-29 NOTE — PATIENT INSTRUCTIONS
Return to office in one year unless having any problems such as breast changes, bleeding, new persistent pain, new progressive bloating, new problems eating (getting full to quickly) or new constant urinary pressure that does not resolve in one week  Call in six months to schedule your annual visit  Your last mammogram was 3/31/2022; it is due next March  Call for a dermatology visit  [facility] in [facility]   Phone: [phone]

## 2022-09-29 NOTE — ASSESSMENT & PLAN NOTE
[address] Hernandez with 11 yo daughter's mental health and personal anxiety/stress  Seeing counselor, declines meds  Ex- to be incarcerated for 5 years; no h/o DV  No breast or gyn complaints  Normal breast and pelvic exams s/p hysterectomy     Mammo order given, last 3/2022 BIRADS 2C

## 2022-10-12 ENCOUNTER — OFFICE VISIT (OUTPATIENT)
Dept: GASTROENTEROLOGY | Facility: CLINIC | Age: 47
End: 2022-10-12
Payer: COMMERCIAL

## 2022-10-12 VITALS
SYSTOLIC BLOOD PRESSURE: 118 MMHG | HEIGHT: 59 IN | DIASTOLIC BLOOD PRESSURE: 68 MMHG | WEIGHT: 155 LBS | BODY MASS INDEX: 31.25 KG/M2

## 2022-10-12 DIAGNOSIS — R10.12 LEFT UPPER QUADRANT ABDOMINAL PAIN: Primary | ICD-10-CM

## 2022-10-12 DIAGNOSIS — K59.00 CONSTIPATION, UNSPECIFIED CONSTIPATION TYPE: ICD-10-CM

## 2022-10-12 DIAGNOSIS — K21.9 GASTROESOPHAGEAL REFLUX DISEASE, UNSPECIFIED WHETHER ESOPHAGITIS PRESENT: ICD-10-CM

## 2022-10-12 DIAGNOSIS — Z12.11 SCREENING FOR COLON CANCER: ICD-10-CM

## 2022-10-12 PROCEDURE — 99213 OFFICE O/P EST LOW 20 MIN: CPT | Performed by: NURSE PRACTITIONER

## 2022-10-12 RX ORDER — PANTOPRAZOLE SODIUM 40 MG/1
40 TABLET, DELAYED RELEASE ORAL
Qty: 60 TABLET | Refills: 6 | Status: SHIPPED | OUTPATIENT
Start: 2022-10-12

## 2022-10-12 RX ORDER — FAMOTIDINE 20 MG/1
20 TABLET, FILM COATED ORAL
Qty: 30 TABLET | Refills: 6 | Status: SHIPPED | OUTPATIENT
Start: 2022-10-12

## 2022-10-12 NOTE — PROGRESS NOTES
[id number] - Outpatient Follow-up Note  Darius Angeles 52 y o  female MRN: [id number]  Encounter: [id number]    ASSESSMENT AND PLAN:      1  Left upper quadrant abdominal pain  Patient states she has been having on and off left upper quadrant discomfort states it is mostly dull discomfort  On exam patient denies any abdominal pain  Patient states could possibly be muscle skeletal since she has been doing more lifting of heavy items around her home  Re-evaluate with follow-up office visit  In current patient setting more likely muscle skeletal versus diverticular disease  2  Constipation, unspecified constipation type  Patient states since last office visit she has been improving her dietary discretion and has been using Metamucil more frequently  States her bowel movements daily are more formed and constipation resolved  - 20-25 g fiber per day  - adequate fluids    3  Gastroesophageal reflux disease, unspecified whether esophagitis present  After patient had EGD and positive biopsy for H pylori PPI was held for eradication testing  Patient did not resume her PPI after completing specimen collection which was negative for H pylori  Patient has been experiencing increased acid reflux symptoms and increased HS  Instructed patient to restart pantoprazole 40 mg twice daily and Pepcid 20 mg HS  EGD did reveal mild gastritis  Patient states she had been under increased stress, which has lessened  - IBard as directed  - pantoprazole (PROTONIX) 40 mg tablet; Take 1 tablet (40 mg total) by mouth 2 (two) times a day before meals  Dispense: 60 tablet; Refill: 6  - famotidine (PEPCID) 20 mg tablet; Take 1 tablet (20 mg total) by mouth daily at bedtime  Dispense: 30 tablet; Refill: 6    4   Screening for colon cancer  Colonoscopy 04/15/2022; 10 year recall      Followup Appointment:  3 months  ______________________________________________________________________    Chief Complaint   Patient presents with   • Follow-up     HPI:  59-year-old female presents for follow-up medication change after EGD 4/15/22  Patient had been treated for H pylori from results of EGD biopsy and had a negative H pylori antigen on 08/27  EGD noted mild gastritis otherwise normal esophagus and duodenum  Patient appeared to miss understand instructions after last office visit and had stopped her PPI for her testing for H pylori however did not restart  She states she has been having increased acid reflux symptoms since  Instructed patient to continue to be taking pantoprazole 40 mg twice daily and Pepcid 20 mg HS  On exam, patient denies nausea or vomiting  She states she does have left upper quadrant discomfort that comes and goes and is dull  Patient states discomfort may also be contributed to muscle skeletal issues noting she has been picking up heavy items at home  Colonoscopy 04/15/2022 noted small hemorrhoid, otherwise normal, 10 year recall        Historical Information   Past Medical History:   Diagnosis Date   • At high risk for breast cancer 2019    Jeremy Cavazos lifetime risk 28%   • Dense breast tissue on mammogram    • GERD (gastroesophageal reflux disease)    • Hematuria     Urology evaluation   • History of benign breast biopsy 03/23/2020    fibroadenoma   • Hx of screening mammography 03/2021   • Migraines      Past Surgical History:   Procedure Laterality Date   • ANKLE SURGERY Left    • BREAST BIOPSY Right 03/23/2020    fibroadenoma   • CYST REMOVAL Right     axilla about 20 yrs ago   • HYSTERECTOMY  2009    uterus and cervix   • LAPAROSCOPIC ENDOMETRIOSIS FULGURATION  2001   • MAMMO (HISTORICAL)  03/31/2022    Reviewed on [facility] BIRADS 2C   • MYOMECTOMY  03/2020    right bx benign fibroadenoma   • SINUS SURGERY     • TONSILLECTOMY     • WRIST SURGERY Right      Social History     Substance and Sexual Activity   Alcohol Use Not Currently    Comment: rarly     Social History Substance and Sexual Activity   Drug Use Never     Social History     Tobacco Use   Smoking Status Never Smoker   Smokeless Tobacco Never Used     Family History   Problem Relation Age of Onset   • Breast cancer Mother 54   • Hypertension Mother    • Thyroid disease Mother    • Depression Father    • Hypertension Father    • Lupus Sister    • Thyroid disease Sister    • No Known Problems Brother    • Colon cancer Neg Hx    • Colon polyps Neg Hx          Current Outpatient Medications:   •  famotidine (PEPCID) 20 mg tablet  •  Multiple Vitamin (multivitamin) tablet  •  pantoprazole (PROTONIX) 40 mg tablet  Allergies   Allergen Reactions   • Bactrim [Sulfamethoxazole-Trimethoprim] Rash   • Cleocin [Clindamycin] Rash     Reviewed medications and allergies and updated as indicated    PHYSICAL EXAM:    Blood pressure 118/68, height 4' 11" (1 499 m), weight 70 3 kg (155 lb)  Body mass index is 31 31 kg/m²  Normal exam    General Appearance: NAD, cooperative, alert  Eyes: Anicteric, PERRLA, EOMI  ENT:  Normocephalic, atraumatic, normal mucosa  Neck:  Supple, symmetrical, trachea midline  Resp:  Clear to auscultation bilaterally; no rales, rhonchi or wheezing; respirations unlabored   CV:  S1 S2, Regular rate and rhythm; no murmur, rub, or gallop  GI:  Soft, non-tender, non-distended; normal bowel sounds; no masses, no organomegaly   Rectal: Deferred  Musculoskeletal: No cyanosis, clubbing or edema  Normal ROM  Skin:  No jaundice, rashes, or lesions   Heme/Lymph: No palpable cervical lymphadenopathy  Psych: Normal affect, good eye contact  Neuro: No gross deficits, AAOx3    Lab Results:   No results found for: WBC, HGB, HCT, MCV, PLT  No results found for: NA, K, CL, CO2, ANIONGAP, BUN, CREATININE, GLUCOSE, GLUF, CALCIUM, CORRECTEDCA, AST, ALT, ALKPHOS, PROT, BILITOT, EGFR  No results found for: IRON, TIBC, FERRITIN  No results found for: LIPASE    Radiology Results:   No results found

## 2023-01-16 ENCOUNTER — TELEPHONE (OUTPATIENT)
Dept: GASTROENTEROLOGY | Facility: CLINIC | Age: 48
End: 2023-01-16

## 2023-01-16 ENCOUNTER — OFFICE VISIT (OUTPATIENT)
Dept: GASTROENTEROLOGY | Facility: CLINIC | Age: 48
End: 2023-01-16

## 2023-01-16 VITALS
HEIGHT: 59 IN | DIASTOLIC BLOOD PRESSURE: 57 MMHG | WEIGHT: 154.2 LBS | BODY MASS INDEX: 31.08 KG/M2 | SYSTOLIC BLOOD PRESSURE: 113 MMHG

## 2023-01-16 DIAGNOSIS — R10.12 LEFT UPPER QUADRANT ABDOMINAL PAIN: Primary | ICD-10-CM

## 2023-01-16 DIAGNOSIS — K21.9 GASTROESOPHAGEAL REFLUX DISEASE WITHOUT ESOPHAGITIS: ICD-10-CM

## 2023-01-16 DIAGNOSIS — K59.00 CONSTIPATION, UNSPECIFIED CONSTIPATION TYPE: ICD-10-CM

## 2023-01-16 DIAGNOSIS — Z12.11 SCREENING FOR COLON CANCER: ICD-10-CM

## 2023-01-16 NOTE — PROGRESS NOTES
[id number] - Outpatient Follow-up Note  Willam Horton 52 y o  female MRN: [id number]  Encounter: [id number]    ASSESSMENT AND PLAN:      1  Left upper quadrant abdominal pain  2  Constipation, unspecified constipation type  Patient states that her previous left upper quadrant abdominal pain which may have been caused by muscle skeletal discomfort when she was lifting heavy objects has improved greatly  Discussed with patient if she continues to lift heavier objects to use appropriate body mechanics especially using her legs for the heavier objects  Abdominal pain may or may not have also been related to her constipation which she has also resolved with increased fiber and fluids  She states she does also continue to use MiraLAX as needed  3  Gastroesophageal reflux disease without esophagitis  Patient states that her acid reflux symptoms are well managed with pantoprazole 40 mg twice daily and famotidine 20 mg at bedtime  She states she does rarely have breakthrough symptoms however notes that with poor dietary discretion such as sauces or pizza is when the breakthrough occurs  Discussed identifying trigger foods and doing her best to stay away from them will help with her acid reflux symptoms as well  Consider GERD versus dyspepsia    4  Screening for colon cancer  Colonoscopy 4/2022; 10-year recall      Followup Appointment: 1 year or as needed  ______________________________________________________________________    Chief Complaint   Patient presents with   • GERD     Improved  HPI: 26-year-old female presents for follow-up office visit for EGD, GERD and history of H  Pylori  Patient had been treated for H  pylori off EGD completed 4/2022 which also showed mild gastritis  H  pylori resolved as of 8/27/2022  Patient had been having complaints of left upper quadrant pain which she states she believes is muscle skeletal and has been pretty much improved    She states her constipation concerns have been controlled with increased fiber, fluids and MiraLAX as needed  She states her acid reflux symptoms are well controlled with Protonix 40 mg twice daily and Pepcid 20 mg at bedtime  Patient states that she does have rare breakthrough with poor food discretion  She states things with sauces like pizza can have a mild effect on her  She also states that she continues to be under increased personal stress which has not helped her condition  On exam, she denies nausea, vomiting or abdominal pain  States her weight is stable  She also states she is having daily normal bowel movements  She denies hematochezia or melena  Non-smoker, denies EtOH use  Last colonoscopy 4/15/2022, 10-year recall      Historical Information   Past Medical History:   Diagnosis Date   • At high risk for breast cancer 2019    [facility] [facility] lifetime risk 28%   • Dense breast tissue on mammogram    • GERD (gastroesophageal reflux disease)    • Hematuria     Urology evaluation   • History of benign breast biopsy 03/23/2020    fibroadenoma   • Hx of screening mammography 03/2021   • Migraines      Past Surgical History:   Procedure Laterality Date   • ANKLE SURGERY Left    • BREAST BIOPSY Right 03/23/2020    fibroadenoma   • CYST REMOVAL Right     axilla about 20 yrs ago   • HYSTERECTOMY  2009    uterus and cervix   • LAPAROSCOPIC ENDOMETRIOSIS FULGURATION  2001   • MAMMO (HISTORICAL)  03/31/2022    Reviewed on [employer] BIRADS 2C   • MYOMECTOMY  03/2020    right bx benign fibroadenoma   • SINUS SURGERY     • TONSILLECTOMY     • WRIST SURGERY Right      Social History     Substance and Sexual Activity   Alcohol Use Not Currently    Comment: rarly     Social History     Substance and Sexual Activity   Drug Use Never     Social History     Tobacco Use   Smoking Status Never   Smokeless Tobacco Never     Family History   Problem Relation Age of Onset   • Breast cancer Mother 54   • Hypertension Mother    • Thyroid disease Mother    • Depression Father    • Hypertension Father    • Lupus Sister    • Thyroid disease Sister    • No Known Problems Brother    • Colon cancer Neg Hx    • Colon polyps Neg Hx          Current Outpatient Medications:   •  famotidine (PEPCID) 20 mg tablet  •  Multiple Vitamin (multivitamin) tablet  •  pantoprazole (PROTONIX) 40 mg tablet  Allergies   Allergen Reactions   • Bactrim [Sulfamethoxazole-Trimethoprim] Rash   • Cleocin [Clindamycin] Rash     Reviewed medications and allergies and updated as indicated    PHYSICAL EXAM:    Blood pressure 113/57, height 4' 11 02" (1 499 m), weight 69 9 kg (154 lb 3 2 oz)  Body mass index is 31 13 kg/m²  Normal exam    General Appearance: NAD, cooperative, alert  Eyes: Anicteric, PERRLA, EOMI  ENT:  Normocephalic, atraumatic, normal mucosa  Neck:  Supple, symmetrical, trachea midline  Resp:  Clear to auscultation bilaterally; no rales, rhonchi or wheezing; respirations unlabored   CV:  S1 S2, Regular rate and rhythm; no murmur, rub, or gallop  GI:  Soft, non-tender, non-distended; normal bowel sounds; no masses, no organomegaly   Rectal: Deferred  Musculoskeletal: No cyanosis, clubbing or edema  Normal ROM  Skin:  No jaundice, rashes, or lesions   Heme/Lymph: No palpable cervical lymphadenopathy  Psych: Normal affect, good eye contact  Neuro: No gross deficits, AAOx3    Lab Results:   No results found for: WBC, HGB, HCT, MCV, PLT  No results found for: NA, K, CL, CO2, ANIONGAP, BUN, CREATININE, GLUCOSE, GLUF, CALCIUM, CORRECTEDCA, AST, ALT, ALKPHOS, PROT, BILITOT, EGFR  No results found for: IRON, TIBC, FERRITIN  No results found for: LIPASE    Radiology Results:   No results found

## 2023-04-04 NOTE — PROGRESS NOTES
Assessment/Plan:    Hot flashes  Concerned re: intermittent menopausal symptoms  Behavioral modification and black cohosh recommended  Will check TSH, FSH, E2 and contact with results  PCP rx sertraline for mood, discussed additional benefits for hot flash control  Diagnoses and all orders for this visit:    Hot flashes  -     TSH, 3rd generation with Free T4 reflex; Future  -     Estradiol; Future  -     Follicle stimulating hormone; Future  -     TSH, 3rd generation with Free T4 reflex  -     Estradiol  -     Follicle stimulating hormone          Subjective:      Patient ID: Peyton Garibay is a 52 y o  female  HPI New issues with hotflashes that got somewhat better this week, two weeks prior sig flashing (first time)  Also with sleep issues, recent Mg seems to be helping  The following portions of the patient's history were reviewed and updated as appropriate:   She  has a past medical history of At high risk for breast cancer (2019), Dense breast tissue on mammogram, GERD (gastroesophageal reflux disease), Hematuria, History of benign breast biopsy (03/23/2020), screening mammography (03/2021), and Migraines  She   Patient Active Problem List    Diagnosis Date Noted   • Hot flashes 04/07/2023   • Encounter for gynecological examination (general) (routine) without abnormal findings 09/29/2022   • Dense breast tissue on mammogram - heterogeneous 2022 06/01/2022   • At high risk for breast cancer 06/01/2022   • History of benign breast biopsy 03/23/2020     She  has a past surgical history that includes Hysterectomy (2009); Myomectomy (03/2020); Tonsillectomy; Ankle surgery (Left); Sinus surgery; Wrist surgery (Right); Cyst Removal (Right); Laparoscopic endometriosis fulguration (2001); Breast biopsy (Right, 03/23/2020); and Mammo (historical) (03/31/2022)    Her family history includes Breast cancer (age of onset: 54) in her mother; Depression in her father; Hypertension in her father and mother; "Lupus in her sister; No Known Problems in her brother; Thyroid disease in her mother and sister  She  reports that she has never smoked  She has never used smokeless tobacco  She reports that she does not currently use alcohol  She reports that she does not use drugs  Current Outpatient Medications   Medication Sig Dispense Refill   • famotidine (PEPCID) 20 mg tablet Take 1 tablet (20 mg total) by mouth daily at bedtime 30 tablet 6   • Multiple Vitamin (multivitamin) tablet Take 1 tablet by mouth daily       No current facility-administered medications for this visit  She is allergic to latex, bactrim [sulfamethoxazole-trimethoprim], and cleocin [clindamycin]       Review of Systems  +hot flashes +sleep disturbances    Objective:      /80 (BP Location: Left arm, Patient Position: Sitting, Cuff Size: Standard)   Ht 4' 11\" (1 499 m)   Wt 65 6 kg (144 lb 9 6 oz)   BMI 29 21 kg/m²          Physical Exam    Appears well, no apparent distress  Does not appear anxious or depressed      "

## 2023-04-07 ENCOUNTER — OFFICE VISIT (OUTPATIENT)
Dept: OBGYN CLINIC | Facility: CLINIC | Age: 48
End: 2023-04-07

## 2023-04-07 VITALS
BODY MASS INDEX: 29.15 KG/M2 | HEIGHT: 59 IN | DIASTOLIC BLOOD PRESSURE: 80 MMHG | WEIGHT: 144.6 LBS | SYSTOLIC BLOOD PRESSURE: 132 MMHG

## 2023-04-07 DIAGNOSIS — R23.2 HOT FLASHES: Primary | ICD-10-CM

## 2023-04-07 NOTE — ASSESSMENT & PLAN NOTE
Concerned re: intermittent menopausal symptoms  Behavioral modification and black cohosh recommended  Will check TSH, FSH, E2 and contact with results  PCP rx sertraline for mood, discussed additional benefits for hot flash control

## 2023-04-07 NOTE — PATIENT INSTRUCTIONS
Try Estroven (black cohosh) for the hot flashes  Avoid things that make the hot flashes worse such as alcohol, red wine, spicy food, hot liquids  Deep breathing exercises and decreasing your breaths to 8 per minute can help with the hot flashes

## 2023-04-09 LAB
ESTRADIOL SERPL-MCNC: <5 PG/ML
FSH SERPL-ACNC: 63.7 MIU/ML
TSH SERPL DL<=0.005 MIU/L-ACNC: 1.55 UIU/ML (ref 0.45–4.5)

## 2023-10-07 NOTE — PROGRESS NOTES
Assessment/Plan:    Encounter for gynecological examination (general) (routine) without abnormal findings  Here for well check, resolved menopausal symptoms. Continues with stress at work and home (17 yo daughter). No breast or gyn complaints. Normal breast and pelvic exams. Last pap 2008, now s/p hysterectomy  Mammo order given, last 3/31/22 BIRADS 2C  Colonoscopy 4/2022; due 2032       Diagnoses and all orders for this visit:    Family history of breast cancer in mother  -     Mammo screening bilateral w 3d & cad; Future    Encounter for gynecological examination (general) (routine) without abnormal findings    Breast cancer screening by mammogram  -     Mammo screening bilateral w 3d & cad; Future    Other orders  -     meclizine (ANTIVERT) 25 mg tablet; take 1 tablet by mouth every 8 hours if needed (DIZZINESS)          Subjective:      Patient ID: Sofia Sanchez is a 50 y.o. female. HPI Here for well check. The following portions of the patient's history were reviewed and updated as appropriate:   She  has a past medical history of At high risk for breast cancer (2019), Dense breast tissue on mammogram, GERD (gastroesophageal reflux disease), Hematuria, History of benign breast biopsy (03/23/2020), screening mammography (03/2021), and Migraines. She   Patient Active Problem List    Diagnosis Date Noted   • Encounter for gynecological examination (general) (routine) without abnormal findings 09/29/2022   • Dense breast tissue on mammogram - heterogeneous 2022 06/01/2022   • At high risk for breast cancer 06/01/2022   • History of benign breast biopsy 03/23/2020     She  has a past surgical history that includes Hysterectomy (2009); Myomectomy (03/2020); Tonsillectomy; Ankle surgery (Left); Sinus surgery; Wrist surgery (Right); Cyst Removal (Right); Laparoscopic endometriosis fulguration (2001); Breast biopsy (Right, 03/23/2020); Mammo (historical) (03/31/2022); and Colonoscopy (04/2022).   Her family history includes Anxiety disorder in her daughter; Breast cancer (age of onset: 54) in her mother; Depression in her daughter and father; Heart attack in her paternal grandfather and paternal grandmother; Hypertension in her father and mother; Lupus in her sister; No Known Problems in her brother; Skin cancer in her maternal grandmother; Stomach cancer in her maternal grandfather; Thyroid disease in her mother and sister; Uterine cancer in her maternal grandmother. She  reports that she has never smoked. She has never used smokeless tobacco. She reports current alcohol use. She reports that she does not use drugs. Current Outpatient Medications   Medication Sig Dispense Refill   • meclizine (ANTIVERT) 25 mg tablet take 1 tablet by mouth every 8 hours if needed (DIZZINESS)     • Multiple Vitamin (multivitamin) tablet Take 1 tablet by mouth daily       No current facility-administered medications for this visit. She is allergic to latex, bactrim [sulfamethoxazole-trimethoprim], and cleocin [clindamycin]. .    Review of Systems  No breast, bladder, bowel changes.  No new persistent pain, bloating, early satiety or pelvic pressure      Objective:      /62 (BP Location: Left arm, Patient Position: Sitting, Cuff Size: Standard)   Ht 4' 10.5" (1.486 m)   Wt 64 kg (141 lb)   BMI 28.97 kg/m²          Physical Exam    General appearance: no distress, pleasant  Neck: thyroid without nodules or thyromegaly, no palpable adenopathy  Lymph nodes: no palpable adenopathy  Breasts: no masses, nodes or skin changes  Abdomen: soft, non tender, no palpable masses  Pelvic exam: normal external genitalia, urethral meatus normal, vaginal cuff intact, no adnexal masses, non tender  Rectal exam: no masses, non tender, RV confirms above

## 2023-10-09 ENCOUNTER — ANNUAL EXAM (OUTPATIENT)
Dept: OBGYN CLINIC | Facility: CLINIC | Age: 48
End: 2023-10-09
Payer: COMMERCIAL

## 2023-10-09 VITALS
HEIGHT: 59 IN | SYSTOLIC BLOOD PRESSURE: 120 MMHG | BODY MASS INDEX: 28.43 KG/M2 | DIASTOLIC BLOOD PRESSURE: 62 MMHG | WEIGHT: 141 LBS

## 2023-10-09 DIAGNOSIS — Z12.31 BREAST CANCER SCREENING BY MAMMOGRAM: ICD-10-CM

## 2023-10-09 DIAGNOSIS — Z01.419 ENCOUNTER FOR GYNECOLOGICAL EXAMINATION (GENERAL) (ROUTINE) WITHOUT ABNORMAL FINDINGS: Primary | ICD-10-CM

## 2023-10-09 DIAGNOSIS — Z80.3 FAMILY HISTORY OF BREAST CANCER IN MOTHER: ICD-10-CM

## 2023-10-09 PROBLEM — R23.2 HOT FLASHES: Status: RESOLVED | Noted: 2023-04-07 | Resolved: 2023-10-09

## 2023-10-09 PROCEDURE — S0612 ANNUAL GYNECOLOGICAL EXAMINA: HCPCS | Performed by: OBSTETRICS & GYNECOLOGY

## 2023-10-09 RX ORDER — MECLIZINE HYDROCHLORIDE 25 MG/1
TABLET ORAL
COMMUNITY
Start: 2023-09-06

## 2023-10-09 NOTE — LETTER
October 9, 2023     [address] [address]    Patient: Jessie Hidalgo   YOB: 1975   Date of Visit: 10/9/2023       Dear Casie Lavelle:    Jessie Hidalgo was in today for her annual gyn exam. Below are my notes for this consultation. If you have questions, please do not hesitate to call me. I look forward to following your patient along with you. Sincerely,        Pilar Rain MD        CC: No Recipients    Pilar Rain MD  10/9/2023  3:25 PM  Sign when Signing Visit  Assessment/Plan:    Encounter for gynecological examination (general) (routine) without abnormal findings  Here for well check, resolved menopausal symptoms. Continues with stress at work and home (15 yo daughter). No breast or gyn complaints. Normal breast and pelvic exams. Last pap 2008, now s/p hysterectomy  Mammo order given, last 3/31/22 BIRADS 2C  Colonoscopy 4/2022; due 2032      Diagnoses and all orders for this visit:    Family history of breast cancer in mother  -     Mammo screening bilateral w 3d & cad; Future    Encounter for gynecological examination (general) (routine) without abnormal findings    Breast cancer screening by mammogram  -     Mammo screening bilateral w 3d & cad; Future    Other orders  -     meclizine (ANTIVERT) 25 mg tablet; take 1 tablet by mouth every 8 hours if needed (DIZZINESS)         Subjective:     Patient ID: Jessie Hidalgo is a 50 y.o. female. HPI Here for well check. The following portions of the patient's history were reviewed and updated as appropriate:   She  has a past medical history of At high risk for breast cancer (2019), Dense breast tissue on mammogram, GERD (gastroesophageal reflux disease), Hematuria, History of benign breast biopsy (03/23/2020), screening mammography (03/2021), and Migraines.   She   Patient Active Problem List    Diagnosis Date Noted   • Encounter for gynecological examination (general) (routine) without abnormal findings 09/29/2022   • Dense breast tissue on mammogram - heterogeneous 2022 06/01/2022   • At high risk for breast cancer 06/01/2022   • History of benign breast biopsy 03/23/2020     She  has a past surgical history that includes Hysterectomy (2009); Myomectomy (03/2020); Tonsillectomy; Ankle surgery (Left); Sinus surgery; Wrist surgery (Right); Cyst Removal (Right); Laparoscopic endometriosis fulguration (2001); Breast biopsy (Right, 03/23/2020); Mammo (historical) (03/31/2022); and Colonoscopy (04/2022). Her family history includes Anxiety disorder in her daughter; Breast cancer (age of onset: 54) in her mother; Depression in her daughter and father; Heart attack in her paternal grandfather and paternal grandmother; Hypertension in her father and mother; Lupus in her sister; No Known Problems in her brother; Skin cancer in her maternal grandmother; Stomach cancer in her maternal grandfather; Thyroid disease in her mother and sister; Uterine cancer in her maternal grandmother. She  reports that she has never smoked. She has never used smokeless tobacco. She reports current alcohol use. She reports that she does not use drugs. Current Outpatient Medications   Medication Sig Dispense Refill   • meclizine (ANTIVERT) 25 mg tablet take 1 tablet by mouth every 8 hours if needed (DIZZINESS)     • Multiple Vitamin (multivitamin) tablet Take 1 tablet by mouth daily       No current facility-administered medications for this visit. She is allergic to latex, bactrim [sulfamethoxazole-trimethoprim], and cleocin [clindamycin]. .    Review of Systems No breast, bladder, bowel changes.  No new persistent pain, bloating, early satiety or pelvic pressure      Objective:      /62 (BP Location: Left arm, Patient Position: Sitting, Cuff Size: Standard)   Ht 4' 10.5" (1.486 m)   Wt 64 kg (141 lb)   BMI 28.97 kg/m²         Physical Exam   General appearance: no distress, pleasant  Neck: thyroid without nodules or thyromegaly, no palpable adenopathy  Lymph nodes: no palpable adenopathy  Breasts: no masses, nodes or skin changes  Abdomen: soft, non tender, no palpable masses  Pelvic exam: normal external genitalia, urethral meatus normal, vaginal cuff intact, no adnexal masses, non tender  Rectal exam: no masses, non tender, RV confirms above

## 2023-10-09 NOTE — ASSESSMENT & PLAN NOTE
Here for well check, resolved menopausal symptoms. Continues with stress at work and home (15 yo daughter). No breast or gyn complaints. Normal breast and pelvic exams.   Last pap 2008, now s/p hysterectomy  Mammo order given, last 3/31/22 BIRADS 2C  Colonoscopy 4/2022; due 2032